# Patient Record
Sex: FEMALE | URBAN - METROPOLITAN AREA
[De-identification: names, ages, dates, MRNs, and addresses within clinical notes are randomized per-mention and may not be internally consistent; named-entity substitution may affect disease eponyms.]

---

## 2020-06-29 ENCOUNTER — OFFICE VISIT (OUTPATIENT)
Dept: PRIMARY CARE CLINIC | Age: 35
End: 2020-06-29

## 2020-06-29 PROCEDURE — 99213 OFFICE O/P EST LOW 20 MIN: CPT | Performed by: NURSE PRACTITIONER

## 2020-07-02 LAB
SARS-COV-2: NOT DETECTED
SOURCE: NORMAL

## 2024-11-02 ENCOUNTER — HOSPITAL ENCOUNTER (EMERGENCY)
Age: 39
Discharge: HOME | End: 2024-11-02
Attending: STUDENT IN AN ORGANIZED HEALTH CARE EDUCATION/TRAINING PROGRAM
Payer: COMMERCIAL

## 2024-11-02 VITALS
HEART RATE: 60 BPM | SYSTOLIC BLOOD PRESSURE: 139 MMHG | RESPIRATION RATE: 12 BRPM | WEIGHT: 137 LBS | OXYGEN SATURATION: 100 % | DIASTOLIC BLOOD PRESSURE: 79 MMHG | TEMPERATURE: 98.8 F

## 2024-11-02 DIAGNOSIS — S01.81XA CHIN LACERATION, INITIAL ENCOUNTER: ICD-10-CM

## 2024-11-02 DIAGNOSIS — M54.2 NECK PAIN: ICD-10-CM
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
## 2024-11-02 DIAGNOSIS — T14.8XXA ABRASION: ICD-10-CM

## 2024-11-02 DIAGNOSIS — S06.0X0A CONCUSSION WITHOUT LOSS OF CONSCIOUSNESS, INITIAL ENCOUNTER: ICD-10-CM

## 2024-11-02 PROCEDURE — 6360000002 HC RX W HCPCS: Performed by: STUDENT IN AN ORGANIZED HEALTH CARE EDUCATION/TRAINING PROGRAM

## 2024-11-02 PROCEDURE — 12011 RPR F/E/E/N/L/M 2.5 CM/<: CPT

## 2024-11-02 PROCEDURE — 90715 TDAP VACCINE 7 YRS/> IM: CPT | Performed by: STUDENT IN AN ORGANIZED HEALTH CARE EDUCATION/TRAINING PROGRAM

## 2024-11-02 PROCEDURE — 6370000000 HC RX 637 (ALT 250 FOR IP): Performed by: STUDENT IN AN ORGANIZED HEALTH CARE EDUCATION/TRAINING PROGRAM

## 2024-11-02 PROCEDURE — 99284 EMERGENCY DEPT VISIT MOD MDM: CPT

## 2024-11-02 PROCEDURE — 96372 THER/PROPH/DIAG INJ SC/IM: CPT

## 2024-11-02 PROCEDURE — 90471 IMMUNIZATION ADMIN: CPT | Performed by: STUDENT IN AN ORGANIZED HEALTH CARE EDUCATION/TRAINING PROGRAM

## 2024-11-02 RX ORDER — KETOROLAC TROMETHAMINE 15 MG/ML
15 INJECTION, SOLUTION INTRAMUSCULAR; INTRAVENOUS ONCE
Status: COMPLETED | OUTPATIENT
Start: 2024-11-02 | End: 2024-11-02

## 2024-11-02 RX ADMIN — KETOROLAC TROMETHAMINE 15 MG: 15 INJECTION INTRAMUSCULAR at 09:13

## 2024-11-02 RX ADMIN — Medication 3 ML: at 09:14

## 2024-11-02 RX ADMIN — TETANUS TOXOID, REDUCED DIPHTHERIA TOXOID AND ACELLULAR PERTUSSIS VACCINE, ADSORBED 0.5 ML: 5; 2.5; 8; 8; 2.5 SUSPENSION INTRAMUSCULAR at 09:13

## 2024-11-02 ASSESSMENT — PAIN DESCRIPTION - DESCRIPTORS: DESCRIPTORS: DISCOMFORT

## 2024-11-02 ASSESSMENT — PAIN - FUNCTIONAL ASSESSMENT
PAIN_FUNCTIONAL_ASSESSMENT: ACTIVITIES ARE NOT PREVENTED
PAIN_FUNCTIONAL_ASSESSMENT: 0-10

## 2024-11-02 ASSESSMENT — PAIN DESCRIPTION - FREQUENCY: FREQUENCY: CONTINUOUS

## 2024-11-02 ASSESSMENT — PAIN DESCRIPTION - PAIN TYPE: TYPE: ACUTE PAIN

## 2024-11-02 ASSESSMENT — PAIN DESCRIPTION - ONSET: ONSET: ON-GOING

## 2024-11-02 ASSESSMENT — PAIN SCALES - GENERAL: PAINLEVEL_OUTOF10: 5

## 2024-11-02 NOTE — ED PROVIDER NOTES
[facility] EMERGENCY DEPARTMENT     EMERGENCY DEPARTMENT ENCOUNTER         Pt Name: Simran Joshua   MRN: [id number]   Birthdate 1985   Date of evaluation: 11/2/2024   Provider: Sabino Mar MD   PCP: No primary care provider on file.   Note Started: 8:43 AM EDT 11/2/24       Chief Complaint     Fall (Mechanical laceration chin )      History of Present Illness     Simran Joshua is a 39 y.o. female who presents with injuries sustained in a mechanical ground-level fall.  The patient has no major contributing past medical history.  Her last tetanus shot was in 2016.  The patient has been in baseline health.  She is training for marathon and was running this morning when she tripped over a cable or similar device being struck at the anterior thighs falling forward and striking her chin on the ground.  There was no medical prodrome.  She denies loss of consciousness after the fall though was \"slow to get up\".  She has a headache.  No nausea or vomiting.  No difficulty with vision.  No motor dysfunction or imbalance.  She sustained an abrasion to the chin which is made hemostatic with direct pressure and she presents for evaluation primarily for the skin wound.  She does report again a headache and some low-grade pain about the lower cervicals paraspinal muscles      Review of Systems     Positives and pertinent negatives as per HPI.    Past Medical, Surgical, Family, and Social History     She has no past medical history on file.  She has no past surgical history on file.  Her family history is not on file.  She reports that she has never smoked. She has never used smokeless tobacco. She reports current alcohol use. She reports that she does not currently use drugs.    SCREENINGS:          Elias Coma Scale  Eye Opening: Spontaneous  Best Verbal Response: Oriented  Best Motor Response: Obeys commands  Elias Coma Scale Score: 15                        CIWA Assessment  BP: 139/79  Pulse: 60        anesthetic and reassess.  Remainder of exam is reassuring for trauma.  We discussed at length next steps at this time I have low suspicion for a spinal injury of significance given her exam and will defer advanced imaging.  Consideration is given to her risk of a central cord syndrome with a hyperextension injury however exam again is benign particular of the upper extremities with good sensation no paresthesias and strength throughout and at this time again via shared decision making we will defer advanced imaging.  We also discussed her headache at the time of no concern for a intracranial hemorrhage or other high risk condition.  The patient did raise concern for a concussive syndrome.  Certainly the patient may have some degree of concussive syndrome though overall low risk with headache being her only symptom.  She has no cognitive slowing there was no loss of consciousness she has no nausea vomiting or other somatic complaints outside of the headache.  We discussed home care for concussion syndrome as well as return to activities and work for anticipatory guidance    ED Course as of 11/02/24 1011   Sat Nov 02, 2024   1010 Patient is reassessed she is resting comfortably no acute distress overall symptomatically stable to improved with therapies provided here.  I repaired the chin laceration as per the procedure note without complication patient tolerated well discharged in stable condition for routine outpatient care [NG]      ED Course User Index  [NG] Sabino Mar MD       Additional Reassessment    Is this patient to be included in the SEP-1 core measure? No Exclusion criteria - the patient is NOT to be included for SEP-1 Core Measure due to: Infection is not suspected    Medical Decision Making  Presentation for injury sustained mechanical ground-level fall including abrasion with small laceration to right shin repaired primarily additionally with musculoskeletal pain about the neck no high risk

## 2024-11-02 NOTE — DISCHARGE INSTRUCTIONS
You were evaluated in the emergency department for evaluation of wounds sustained in a fall. Assessments and testing completed during your visit were reassuring and at this time there is no indication for further testing, treatment or admission to the hospital. Given this it is appropriate to discharge you from the emergency department. At the time of discharge we discussed the following:    Please reference the enclosed information.  Regarding the wounds to your chin as we discussed I do recommend keeping covered with a thick layer of triple antibiotic ointment applied twice daily until fully healed.  Please attempt to keep the wound dry for the next 2 days after which you may wash gently pat dry and again apply the triple antibiotic ointment to keep covered.  You may apply a bandage as desired.  The stitches will dissolve and fall out on their own in the next 5 to 7 days.  The wound requires no further follow-up however if you have concern for wound healing including increasing pain redness swelling or discharge please return to the emerge department.  Regarding your other symptoms I do expect your condition to improve including the musculoskeletal pain about the neck and your concussive symptoms.  Please reference the enclosed information regarding home care for concussion and return with any concerns    Please note that sometimes it is difficult to diagnose a medical condition early in the disease process before the disease is fully manifest. Because of this, should you develop any new or worsening symptoms, you may return at any time to the emergency department for another evaluation. If available you are also recommended to review this visit with your primary care physician or other medical provider in the next 7 days. Thank you for allowing us to care for you today.